# Patient Record
Sex: MALE | Race: WHITE
[De-identification: names, ages, dates, MRNs, and addresses within clinical notes are randomized per-mention and may not be internally consistent; named-entity substitution may affect disease eponyms.]

---

## 2017-03-09 ENCOUNTER — HOSPITAL ENCOUNTER (OUTPATIENT)
Dept: HOSPITAL 45 - C.LABPBG | Age: 82
Discharge: HOME | End: 2017-03-09
Attending: FAMILY MEDICINE
Payer: COMMERCIAL

## 2017-03-09 DIAGNOSIS — Z00.00: Primary | ICD-10-CM

## 2017-03-09 DIAGNOSIS — Z12.5: ICD-10-CM

## 2017-03-09 DIAGNOSIS — I10: ICD-10-CM

## 2017-03-09 LAB
ALBUMIN/GLOB SERPL: 1 {RATIO} (ref 0.9–2)
ALP SERPL-CCNC: 106 U/L (ref 45–117)
ALT SERPL-CCNC: 30 U/L (ref 12–78)
ANION GAP SERPL CALC-SCNC: 9 MMOL/L (ref 3–11)
AST SERPL-CCNC: 26 U/L (ref 15–37)
BASOPHILS # BLD: 0.02 K/UL (ref 0–0.2)
BASOPHILS NFR BLD: 0.2 %
BUN SERPL-MCNC: 21 MG/DL (ref 7–18)
BUN/CREAT SERPL: 18.8 (ref 10–20)
CALCIUM SERPL-MCNC: 9.1 MG/DL (ref 8.5–10.1)
CHLORIDE SERPL-SCNC: 104 MMOL/L (ref 98–107)
CHOLEST/HDLC SERPL: 4.5 {RATIO}
CO2 SERPL-SCNC: 28 MMOL/L (ref 21–32)
COMPLETE: YES
CREAT SERPL-MCNC: 1.1 MG/DL (ref 0.6–1.4)
EOSINOPHIL NFR BLD AUTO: 301 K/UL (ref 130–400)
GLOBULIN SER-MCNC: 4.1 GM/DL (ref 2.5–4)
GLUCOSE SERPL-MCNC: 83 MG/DL (ref 70–99)
GLUCOSE UR QL: 48 MG/DL
HCT VFR BLD CALC: 41.5 % (ref 42–52)
IG%: 0.2 %
IMM GRANULOCYTES NFR BLD AUTO: 26 %
KETONES UR QL STRIP: 141 MG/DL
LYMPHOCYTES # BLD: 3.02 K/UL (ref 1.2–3.4)
MCH RBC QN AUTO: 32.1 PG (ref 25–34)
MCHC RBC AUTO-ENTMCNC: 34.2 G/DL (ref 32–36)
MCV RBC AUTO: 93.7 FL (ref 80–100)
MONOCYTES NFR BLD: 6 %
NEUTROPHILS # BLD AUTO: 1.1 %
NEUTROPHILS NFR BLD AUTO: 66.5 %
NITRITE UR QL STRIP: 135 MG/DL (ref 0–150)
PH UR: 216 MG/DL (ref 0–200)
PMV BLD AUTO: 10.3 FL (ref 7.4–10.4)
POTASSIUM SERPL-SCNC: 3.8 MMOL/L (ref 3.5–5.1)
PSA SERPL-MCNC: 4.68 NG/ML (ref 0–4)
RBC # BLD AUTO: 4.43 M/UL (ref 4.7–6.1)
SODIUM SERPL-SCNC: 141 MMOL/L (ref 136–145)
TSH SERPL-ACNC: 2.47 UIU/ML (ref 0.3–4.5)
VERY LOW DENSITY LIPOPROT CALC: 27 MG/DL
WBC # BLD AUTO: 11.6 K/UL (ref 4.8–10.8)

## 2017-03-20 NOTE — CODING QUERY MEDICAL NECESSITY
CQSUPPORTING DIAGNOSIS NEEDED





A supporting diagnosis is required for the test/procedure performed on this patient in 
order for us to be reimbursed by the patient's insurance. Please provide a supporting 
diagnosis for the following test/procedure listed below next to the test name along with 
your signature. 



*If there is no additional diagnosis for this patient that would support the following 
test/procedure please document that below next to the test/procedure.



Test(s)/Procedure(s) that require a supporting diagnosis:



DOS  03/09/17    PROSTATE SPECIFIC TEST      (PSA)



Provider Signature:  ______________________________  Date:  _______



Thank you  

Leah Salter

Health Information Management

Phone:  [phone]

Fax:  [phone]



Once completed, please kindly fax back to [phone]



For questions please call [phone]

## 2017-03-27 ENCOUNTER — HOSPITAL ENCOUNTER (OUTPATIENT)
Dept: HOSPITAL 45 - C.CPL | Age: 82
Discharge: HOME | End: 2017-03-27
Attending: FAMILY MEDICINE
Payer: COMMERCIAL

## 2017-03-27 DIAGNOSIS — R01.1: Primary | ICD-10-CM

## 2017-03-27 NOTE — ECHOCARDIOGRAM REPORT
*NOTICE TO RECEIVING PARTY AGENCY**  This information is strictly Confidential and protected under 
[place] law.  [place] law prohibits you from making any further disclosure of this 
information unless further disclosure is expressly permitted by the written consent of the person to 
whom it pertains or is authorized by law.  A general authorization for the release of medical or 
other information is not sufficient for this purpose.  Hospital accepts no responsibility if the 
information is made available to any other person, INCLUDING THE PATIENT.



Interpretation Summary

  *  Name: NINA OCASIO  Study Date: 2017 01:19 PM  BP: 179/66 mmHg

  *  MRN: [id number]  Patient Location: [facility]  HR: 68

  *  : 1935 (M/[birth year]/yyyy)  Gender: Male

  *  Age: 81 yrs  Ethnicity: [place]  Weight: 165 lb

  *  Ordering Physician: SHAYNE LAWLER DO

  *  Performed By: Brenna Lawler/ Monica Amaral

  *  Accession# [id number]  Account# [id number]

  *  Reason For Study: CYSTOLIC EJECTION MURMUR

  *  Hyperdynamic left ventricular systolic function.

  *  Moderate concentric left ventricular hypertrophy.

  *  Left ventricular diastolic dysfunction.

  *  Mild aortic root diltation.

  *  Moderate - Severe calcific aortic stenosis.

  *  Trace aortic regurgitation.

  *  Trace mitral and tricuspid regurgitation.

  *  Mild pulmonary hypertension.

  *  -- Conclusions --

  *  There is severe calcific aortic valve stenosis.

Procedure Details

  *  A complete two-dimensional transthoracic echocardiogram was performed (2D, M-mode, Doppler and 
color flow Doppler).

Left Ventricle

  *  The left ventricle is normal in size.

  *  There is moderate concentric left ventricular hypertrophy.

  *  A full diastolic examination was done with clinical findings of Class I diastolic dysfunction.

  *  Ejection Fraction = >70 %.

  *  The left ventricle is hyperdynamic.

  *  No regional wall motion abnormalities noted.

Right Ventricle

  *  The right ventricle is normal in size and function.

Atria

  *  The left atrial size is normal.

  *  Right atrial size is normal.

  *  No ASD detected; PFO is not assessed.

Mitral Valve

  *  There is mild mitral annular calcification.

  *  There is no mitral valve stenosis.

  *  There is trace mitral regurgitation.

Tricuspid Valve

  *  The tricuspid valve is normal.

  *  There is no tricuspid stenosis.

  *  There is trace tricuspid regurgitation.

  *  Right ventricular systolic pressure is elevated at 30-40mmHg.

Aortic Valve

  *  The aortic valve is calcified and has decreased opening on 2 D imaging.

  *  There is severe calcific aortic valve stenosis.

  *  Aortic valve area was calculated at 1.3 cm\S\2 using the continuity equation.

  *  Based on the appearance of the AV on 2 D imaging and the mean gradient across the valve there 
is severe aortic stenosis.

  *  Moderate to severe valvular aortic stenosis.

  *  Trace aortic regurgitation.

Pulmonic Valve

  *  The pulmonary valve is inadequately visualized, but the Doppler data is adequate for 
interpretation.

  *  There is no pulmonic valvular stenosis.

  *  There is no significant pulmonary regurgitation.

Great Vessels

  *  Mild aortic root dilatation.



MMode 2D Measurements and Calculations

IVSd 1.6 cm

IVSs 2.3 cm



LVIDd 4.0 cm

LVIDs 2.5 cm

LVPWd 1.5 cm

LVPWs 2.3 cm



IVS/LVPW 1.1 

FS 37.5 %

EDV(Teich) 71.6 ml

ESV(Teich) 22.9 ml

EF(Teich) 68.1 %



EDV(cubed) 65.8 ml

ESV(cubed) 16.1 ml

EF(cubed) 75.6 %

% IVS thick 44.8 %

% LVPW thick 59.5 %





LV mass(C)d 239.1 grams

LV mass(C)s 287.5 grams



SV(Teich) 48.7 ml

SV(cubed) 49.7 ml



Ao root diam 4.1 cm

Ao root area 13.0 cm\S\2

ACS 1.3 cm

LA dimension 3.6 cm



asc Aorta Diam 3.3 cm





LA/Ao 0.88 

LVOT diam 2.2 cm

LVOT area 3.7 cm\S\2



LVAd ap4 34.4 cm\S\2

LVLd ap4 8.8 cm

EDV(MOD-sp4) 107.3 ml

EDV(sp4-el) 114.1 ml

LVAs ap4 15.0 cm\S\2

LVLs ap4 6.5 cm

ESV(MOD-sp4) 33.6 ml

ESV(sp4-el) 29.6 ml

EF(MOD-sp4) 68.7 %

EF(sp4-el) 74.1 %



LVAd ap2 29.1 cm\S\2

LVLd ap2 9.1 cm

EDV(MOD-sp2) 76.4 ml

EDV(sp2-el) 78.9 ml

LVAs ap2 13.6 cm\S\2

LVLs ap2 7.1 cm

ESV(MOD-sp2) 23.6 ml

ESV(sp2-el) 22.0 ml

EF(MOD-sp2) 69.1 %

EF(sp2-el) 72.1 %



LVLd %diff 3.1 %

EDV(MOD-bp) 89.4 ml

LVLs %diff 9.2 %

ESV(MOD-bp) 29.6 ml

EF(MOD-bp) 66.8 %





SV(MOD-sp4) 73.7 ml



SV(MOD-sp2) 52.8 ml



SV(MOD-bp) 59.8 ml



SV(sp4-el) 84.5 ml





SV(sp2-el) 56.8 ml













Doppler Measurements and Calculations

MV E max mahendra 94.5 cm/sec

MV A max mahendra 109.7 cm/sec



MV E/A 0.86 



MV dec time 0.28 sec



Ao V2 max 518.1 cm/sec

Ao max .4 mmHg

Ao max PG (full) 93.8 mmHg

Ao V2 mean 332.1 cm/sec

Ao mean PG 51.1 mmHg

Ao V2 .7 cm

HLOLIS(V,A) 1.3 cm\S\2

HOLLIS(V,D) 1.3 cm\S\2





AI max mahendra 471.9 cm/sec

AI max PG 89.3 mmHg

AI dec slope 400.2 cm/sec\S\2

AI P1/2t 345.3 msec



LV V1 max PG 13.6 mmHg



LV V1 max 184.4 cm/sec



MR max mahendra 726.9 cm/sec

MR max .3 mmHg





SV(Ao) 1410.8 ml



PA V2 max 103.0 cm/sec

PA max PG 4.2 mmHg



TR max mahendra 269.8 cm/sec

## 2018-04-06 ENCOUNTER — HOSPITAL ENCOUNTER (OUTPATIENT)
Dept: HOSPITAL 45 - C.LABPBG | Age: 83
Discharge: HOME | End: 2018-04-06
Attending: FAMILY MEDICINE
Payer: COMMERCIAL

## 2018-04-06 DIAGNOSIS — R97.20: ICD-10-CM

## 2018-04-06 DIAGNOSIS — I12.9: ICD-10-CM

## 2018-04-06 DIAGNOSIS — Z00.00: Primary | ICD-10-CM

## 2018-04-06 LAB
ALBUMIN SERPL-MCNC: 3.7 GM/DL (ref 3.4–5)
ALP SERPL-CCNC: 81 U/L (ref 45–117)
ALT SERPL-CCNC: 31 U/L (ref 12–78)
AST SERPL-CCNC: 30 U/L (ref 15–37)
BUN SERPL-MCNC: 34 MG/DL (ref 7–18)
CALCIUM SERPL-MCNC: 9.2 MG/DL (ref 8.5–10.1)
CO2 SERPL-SCNC: 29 MMOL/L (ref 21–32)
CREAT SERPL-MCNC: 1.27 MG/DL (ref 0.6–1.4)
GLUCOSE SERPL-MCNC: 102 MG/DL (ref 70–99)
KETONES UR QL STRIP: 150 MG/DL
PH UR: 213 MG/DL (ref 0–200)
POTASSIUM SERPL-SCNC: 3.7 MMOL/L (ref 3.5–5.1)
PROT SERPL-MCNC: 7.8 GM/DL (ref 6.4–8.2)
SODIUM SERPL-SCNC: 137 MMOL/L (ref 136–145)